# Patient Record
Sex: MALE | Race: WHITE | NOT HISPANIC OR LATINO | Employment: FULL TIME | URBAN - METROPOLITAN AREA
[De-identification: names, ages, dates, MRNs, and addresses within clinical notes are randomized per-mention and may not be internally consistent; named-entity substitution may affect disease eponyms.]

---

## 2021-09-19 ENCOUNTER — APPOINTMENT (OUTPATIENT)
Dept: GENERAL RADIOLOGY | Facility: CLINIC | Age: 56
End: 2021-09-19
Attending: EMERGENCY MEDICINE
Payer: COMMERCIAL

## 2021-09-19 ENCOUNTER — APPOINTMENT (OUTPATIENT)
Dept: CT IMAGING | Facility: CLINIC | Age: 56
End: 2021-09-19
Attending: EMERGENCY MEDICINE
Payer: COMMERCIAL

## 2021-09-19 ENCOUNTER — HOSPITAL ENCOUNTER (EMERGENCY)
Facility: CLINIC | Age: 56
Discharge: HOME | End: 2021-09-19
Attending: EMERGENCY MEDICINE | Admitting: EMERGENCY MEDICINE
Payer: COMMERCIAL

## 2021-09-19 ENCOUNTER — APPOINTMENT (OUTPATIENT)
Dept: MRI IMAGING | Facility: CLINIC | Age: 56
End: 2021-09-19
Attending: EMERGENCY MEDICINE
Payer: COMMERCIAL

## 2021-09-19 VITALS
WEIGHT: 192.46 LBS | DIASTOLIC BLOOD PRESSURE: 101 MMHG | RESPIRATION RATE: 16 BRPM | SYSTOLIC BLOOD PRESSURE: 156 MMHG | HEART RATE: 68 BPM | TEMPERATURE: 98.2 F | OXYGEN SATURATION: 98 %

## 2021-09-19 DIAGNOSIS — R20.0 LEFT ARM NUMBNESS: ICD-10-CM

## 2021-09-19 LAB
ANION GAP SERPL CALCULATED.3IONS-SCNC: 8 MMOL/L (ref 3–14)
APTT PPP: 30 SECONDS (ref 22–38)
BASOPHILS # BLD AUTO: 0 10E3/UL (ref 0–0.2)
BASOPHILS NFR BLD AUTO: 0 %
BUN SERPL-MCNC: 18 MG/DL (ref 7–30)
CALCIUM SERPL-MCNC: 8.9 MG/DL (ref 8.5–10.1)
CHLORIDE BLD-SCNC: 107 MMOL/L (ref 94–109)
CO2 SERPL-SCNC: 25 MMOL/L (ref 20–32)
CREAT SERPL-MCNC: 1.1 MG/DL (ref 0.66–1.25)
EOSINOPHIL # BLD AUTO: 0.2 10E3/UL (ref 0–0.7)
EOSINOPHIL NFR BLD AUTO: 3 %
ERYTHROCYTE [DISTWIDTH] IN BLOOD BY AUTOMATED COUNT: 12.4 % (ref 10–15)
GFR SERPL CREATININE-BSD FRML MDRD: 74 ML/MIN/1.73M2
GLUCOSE BLD-MCNC: 86 MG/DL (ref 70–99)
GLUCOSE BLDC GLUCOMTR-MCNC: 83 MG/DL (ref 70–99)
HCT VFR BLD AUTO: 49 % (ref 40–53)
HGB BLD-MCNC: 16.6 G/DL (ref 13.3–17.7)
HOLD SPECIMEN: NORMAL
HOLD SPECIMEN: NORMAL
IMM GRANULOCYTES # BLD: 0 10E3/UL
IMM GRANULOCYTES NFR BLD: 0 %
INR PPP: 0.95 (ref 0.85–1.15)
LYMPHOCYTES # BLD AUTO: 2.2 10E3/UL (ref 0.8–5.3)
LYMPHOCYTES NFR BLD AUTO: 30 %
MCH RBC QN AUTO: 31 PG (ref 26.5–33)
MCHC RBC AUTO-ENTMCNC: 33.9 G/DL (ref 31.5–36.5)
MCV RBC AUTO: 92 FL (ref 78–100)
MONOCYTES # BLD AUTO: 0.9 10E3/UL (ref 0–1.3)
MONOCYTES NFR BLD AUTO: 13 %
NEUTROPHILS # BLD AUTO: 4 10E3/UL (ref 1.6–8.3)
NEUTROPHILS NFR BLD AUTO: 54 %
NRBC # BLD AUTO: 0 10E3/UL
NRBC BLD AUTO-RTO: 0 /100
PLATELET # BLD AUTO: 229 10E3/UL (ref 150–450)
POTASSIUM BLD-SCNC: 3.6 MMOL/L (ref 3.4–5.3)
RBC # BLD AUTO: 5.35 10E6/UL (ref 4.4–5.9)
SODIUM SERPL-SCNC: 140 MMOL/L (ref 133–144)
TROPONIN I SERPL-MCNC: <0.015 UG/L (ref 0–0.04)
WBC # BLD AUTO: 7.4 10E3/UL (ref 4–11)

## 2021-09-19 PROCEDURE — 250N000013 HC RX MED GY IP 250 OP 250 PS 637: Performed by: EMERGENCY MEDICINE

## 2021-09-19 PROCEDURE — 99285 EMERGENCY DEPT VISIT HI MDM: CPT | Mod: 25

## 2021-09-19 PROCEDURE — 70498 CT ANGIOGRAPHY NECK: CPT

## 2021-09-19 PROCEDURE — 70450 CT HEAD/BRAIN W/O DYE: CPT

## 2021-09-19 PROCEDURE — 70553 MRI BRAIN STEM W/O & W/DYE: CPT

## 2021-09-19 PROCEDURE — 82374 ASSAY BLOOD CARBON DIOXIDE: CPT | Performed by: EMERGENCY MEDICINE

## 2021-09-19 PROCEDURE — A9585 GADOBUTROL INJECTION: HCPCS | Performed by: EMERGENCY MEDICINE

## 2021-09-19 PROCEDURE — 93005 ELECTROCARDIOGRAM TRACING: CPT

## 2021-09-19 PROCEDURE — 250N000011 HC RX IP 250 OP 636: Performed by: EMERGENCY MEDICINE

## 2021-09-19 PROCEDURE — 85730 THROMBOPLASTIN TIME PARTIAL: CPT | Performed by: EMERGENCY MEDICINE

## 2021-09-19 PROCEDURE — 250N000009 HC RX 250: Performed by: EMERGENCY MEDICINE

## 2021-09-19 PROCEDURE — 84484 ASSAY OF TROPONIN QUANT: CPT | Performed by: EMERGENCY MEDICINE

## 2021-09-19 PROCEDURE — 85610 PROTHROMBIN TIME: CPT | Performed by: EMERGENCY MEDICINE

## 2021-09-19 PROCEDURE — 255N000002 HC RX 255 OP 636: Performed by: EMERGENCY MEDICINE

## 2021-09-19 PROCEDURE — 96374 THER/PROPH/DIAG INJ IV PUSH: CPT | Mod: 59

## 2021-09-19 PROCEDURE — 0042T CT HEAD PERFUSION WITH CONTRAST: CPT

## 2021-09-19 PROCEDURE — 36415 COLL VENOUS BLD VENIPUNCTURE: CPT | Performed by: EMERGENCY MEDICINE

## 2021-09-19 PROCEDURE — 71045 X-RAY EXAM CHEST 1 VIEW: CPT

## 2021-09-19 PROCEDURE — 85004 AUTOMATED DIFF WBC COUNT: CPT | Performed by: EMERGENCY MEDICINE

## 2021-09-19 RX ORDER — ASPIRIN 325 MG
325 TABLET ORAL ONCE
Status: COMPLETED | OUTPATIENT
Start: 2021-09-19 | End: 2021-09-19

## 2021-09-19 RX ORDER — LORAZEPAM 2 MG/ML
1 INJECTION INTRAMUSCULAR ONCE
Status: COMPLETED | OUTPATIENT
Start: 2021-09-19 | End: 2021-09-19

## 2021-09-19 RX ORDER — GADOBUTROL 604.72 MG/ML
10 INJECTION INTRAVENOUS ONCE
Status: COMPLETED | OUTPATIENT
Start: 2021-09-19 | End: 2021-09-19

## 2021-09-19 RX ORDER — IOPAMIDOL 755 MG/ML
500 INJECTION, SOLUTION INTRAVASCULAR ONCE
Status: COMPLETED | OUTPATIENT
Start: 2021-09-19 | End: 2021-09-19

## 2021-09-19 RX ORDER — CLOPIDOGREL BISULFATE 75 MG/1
300 TABLET ORAL ONCE
Status: COMPLETED | OUTPATIENT
Start: 2021-09-19 | End: 2021-09-19

## 2021-09-19 RX ORDER — CLOPIDOGREL BISULFATE 75 MG/1
300 TABLET ORAL ONCE
Status: DISCONTINUED | OUTPATIENT
Start: 2021-09-19 | End: 2021-09-19

## 2021-09-19 RX ADMIN — IOPAMIDOL 50 ML: 755 INJECTION, SOLUTION INTRAVENOUS at 13:48

## 2021-09-19 RX ADMIN — GADOBUTROL 8.5 ML: 604.72 INJECTION INTRAVENOUS at 15:48

## 2021-09-19 RX ADMIN — SODIUM CHLORIDE 95 ML: 9 INJECTION, SOLUTION INTRAVENOUS at 14:00

## 2021-09-19 RX ADMIN — CLOPIDOGREL BISULFATE 300 MG: 75 TABLET ORAL at 14:29

## 2021-09-19 RX ADMIN — LORAZEPAM 1 MG: 2 INJECTION INTRAMUSCULAR; INTRAVENOUS at 15:10

## 2021-09-19 RX ADMIN — ASPIRIN 325 MG ORAL TABLET 325 MG: 325 PILL ORAL at 14:29

## 2021-09-19 ASSESSMENT — ENCOUNTER SYMPTOMS
NECK PAIN: 0
NUMBNESS: 1
MYALGIAS: 0
HEADACHES: 0
WEAKNESS: 0

## 2021-09-19 NOTE — DISCHARGE INSTRUCTIONS
You will need to follow-up with outpatient neurology and get a outpatient echocardiogram to further rule out any possible causes for stroke.  Your MRI and CT today did not show any clear signs of stroke and we believe it is safe for you to go home.  However if you have any recurring symptoms or worsening symptoms please return to the emergency department immediately.  Please begin taking a full aspirin daily until you follow-up with neurology team.

## 2021-09-19 NOTE — CONSULTS
"Neurology note     Dr Hackett called me. MRI brain is done. No acute pathology. Symptoms are still ongoing but numbness felt in the left UE only.     The negative MRI despite ongoing symptoms suggests that the symptoms are not secondary to acute brain ischemia. Possible peripheral process.     We will finish the TIA workup as an outpatient and refer to general neurology.     -No further ED workup at this point.   -Patient can be discharged home on aspirin only.   -Outpatient TTE.   -Follow up with outpatient general neurology in 1-2 weeks.       Ottoniel Merida MD, Msc, MADI, ALICIAN   of Neurology  [facility]     09/19/2021 5:06 PM  To page me or covering stroke neurology team member, click here: AMCOM  Choose \"On Call\" tab at top, then search dropdown box for \"Neurology Adult\" & press Enter, look for Neuro ICU/Stroke    "

## 2021-09-19 NOTE — ED PROVIDER NOTES
"  History   Chief Complaint:  Numbness     The history is provided by the patient.      Drake Rendon is a 56 year old male with a history of hyperlipidemia and hypertension who presents with left arm numbness that began 60-90 minutes prior to arrival. The patient was sitting in his chair reading the paper when the numbness first began and he initially attributed this to his sitting position. He tried standing up and pacing around the house but did not have any improvement in symptoms. Patient ultimately came to ED as he began to notice some numbness on the left side of his face as well as his left lower leg, he notes history of stroke in maternal side of family and became concerned. Here, the patient continues to have numbness in his entire left arm that he likens to the sensation of \"being asleep\" but denies any associated weakness. Denies having any pain to his arm. Also has ongoing numbness to L side of head though patient is unsure whether this is related to his arm symptoms versus history of chronic congestion. Patient denies any headaches or unsteadiness with walking. No neck pain. No prior history of abnormal heart rhythm or other heart problems.    Review of Systems   Musculoskeletal: Negative for gait problem (unsteadiness with walking), myalgias (arm pain) and neck pain.   Neurological: Positive for numbness (L arm, L face). Negative for weakness and headaches.   All other systems reviewed and are negative.    Allergies:  No Known Drug Allergies    Medications:  Lisinopril  Zyrtec     Past Medical History:    Hypertension  Hyperlipidemia   Chronic congestion    Past Surgical History:    Nasal septum surgery    Family History:    Father - arthritis  Mother - stroke    Social History:  The patient was not accompanied to the ED.  Marital status:     Physical Exam     Patient Vitals for the past 24 hrs:   BP Temp Temp src Pulse Resp SpO2 Weight   09/19/21 1445 (!) 156/101 -- -- 68 16 98 % -- "   21 1430 -- -- -- 66 11 99 % --   21 1415 (!) 191/107 -- -- 68 21 99 % --   21 1413 -- -- -- 65 12 98 % --   21 1412 -- -- -- 72 29 97 % --   21 1411 -- -- -- 61 11 98 % --   21 1409 (!) 173/103 -- -- 63 9 100 % --   21 1339 -- -- -- -- -- 98 % --   21 1338 (!) 185/102 -- -- 66 -- -- --   21 1332 -- -- -- -- -- -- 87.3 kg (192 lb 7.4 oz)   21 1331 (!) 163/111 98.2  F (36.8  C) Temporal 63 16 100 % --       Physical Exam  General: Patient is awake, alert and interactive when I enter the room  Head: The scalp, face, and head appear normal  Eyes: The pupils are equal, round, and reactive to light. Conjunctivae and sclerae are normal. No nystagmus. Full ROM of both eyes and appears symmetric without obvious palsy.   ENT: External acoustic canals are normal. The oropharynx is normal without erythema. Uvula is in the midline  Neck: Normal range of motion. No anterior cervical lymphadenopathy noted  CV: Regular rate. S1/S2. No murmurs.   Resp: Lungs are clear without wheezes or rales. No respiratory distress.   GI: Abdomen is soft, no rigidity, guarding, or rebound. No distension. No tenderness to palpation in any quadrant.     MS: Normal tone. Joints grossly normal without effusions. No asymmetric leg swelling, calf or thigh tenderness.    Skin: No rash or lesions noted. Normal capillary refill noted  Neuro:   Speech is normal and fluent.   Face is symmetric without droop. CN's II-XII intact. Negative pronator drift. Finger to nose intact. Heel to shin intact.   Right Arm: Good  strength. 5/5 elbow flexion. 5/5 elbow extension. Sensation intact to light touch.   Left Arm: Good  strength. 5/5 elbow flexion. 5/5 elbow extension.  Subjective change in sensation along the left forearm extending to the left thumb.  Right Le/5 straight leg raise, 5/5 knee flexion, 5/5 knee extension, 5/5 dorsiflexion, 5/5 plantar flexion. Sensation intact to light touch.    Left Le/5 straight leg raise, 5/5 knee flexion, 5/5 knee extension, 5/5 dorsiflexion, 5/5 plantar flexion. Sensation intact to light touch.    Psych:  Normal affect.  Appropriate interactions.    Emergency Department Course     ECG:  Completed at 1435.  Read at 1439.   Normal sinus rhythm   Rate 67 bpm. KS interval 156. QRS duration 90. QT/QTc 426/450. P-R-T axes 38 41 5.  Agree with computer interpretation.     Imaging:  CT Head w/o Contrast:  1.  Normal head CT.  Report per radiology.    CTA Head Neck with Contrast:  HEAD CTA:   1.  Normal CTA [place].  NECK CTA:  1.  Normal neck CTA.  Report per radiology.    CT Head Perfusion w Contrast:  1.  Normal cerebral perfusion.  Report per radiology.    XR Chest Port 1 View:  No pleural fluid or pneumothorax. No airspace disease or edema. Normal size of the heart.  Report per radiology.    MR Brain w/o & w Contrast:  In process  Report per radiology.    Laboratory:  CBC: WBC 7.4, HGB 16.6,   BMP: AWNL (Creatinine 1.10)  Glucose by meter (collected 1337) 83    PTT: 30  INR: 0.95    Troponin (Collected 1343): <0.015    Emergency Department Course:    Reviewed:  I reviewed the patient's nursing notes, vitals, past medical history and care everywhere.     Assessments:  1336 I performed an exam of the patient in room 33 as documented above.  1339 Code stroke Tier 2 activated  1413 Patient rechecked and updated on findings and plan.      Consults:    1342 I spoke with Abril Montague PA-C of the Stroke Neurology service regarding patient's presentation, findings, and plan of care.  1411 I again spoke with Abril Montague in Stroke Neurology regarding patient's CT findings. They recommend MRI, aspirin, and Plavix.    Interventions:  1429 Aspirin 325 mg PO  1429 Plavix 300 mg PO    Disposition:  Care of the patient was transferred to my colleague Dr. Lowe pending MRI results, anticipate discharge home.    Impression & Plan     Medical Decision Making:  Drake DELA CRUZ  Justice is a 56 year old male who presents with numbness to his left arm and face that started approximately 1 hour prior to presentation.  Patient has personal history of hyperlipidemia and high blood pressure as well as family history of stroke but no personal history of TIA or CVA.  Patient was urgently evaluated and stroke code was called.  See physical exam above.  This was highlighted by subjective decrease in light touch to the left arm.  No other acute focal neurological deficits were noted.  CT imaging was obtained which did not show any evidence of large vessel occlusion, intracranial hemorrhage or vascular insult.  On reevaluation the patient's physical exam remains stable.  Spoke with stroke neurology who recommended MRI as well as Plavix load and treatment with aspirin.  MRI of the brain was obtained.  Which fortunately did not show any sign of infarction or additional abnormality.  Once again discussed the case with stroke neurology.  We are not completely convinced that this represents central stroke pathology.  We will have the patient continue to take aspirin and follow-up outpatient for further stroke work-up.  Given minimal symptoms patient was not considered a candidate for TPA.    Diagnosis:    ICD-10-CM    1. Left arm numbness  R20.0          Scribe Disclosure:  I, Yoly Benz, am serving as a scribe at 1:36 PM on 9/19/2021 to document services personally performed by Alberto Hackett MD based on my observations and the provider's statements to me.     This note was completed in part using Dragon voice recognition software. Although reviewed after completion, some word and grammatical errors may occur.     Yoly Benz  9/19/2021   [facility] EMERGENCY DEPARTMENT         Alberto Hackett MD  09/20/21 0645

## 2021-09-19 NOTE — ED TRIAGE NOTES
Pt presents to ED with c/o numbness in left forearm that began 1 hour ago. Also experiencing very mild numbness in his left face and left lower leg as well. Pt is concerned for stroke as he has high BP. ABC intact.

## 2021-09-19 NOTE — CONSULTS
[facility]    Stroke Telephone Note    I was called by Alberto Hackett on 09/19/21 regarding patient Drake Rendon. The patient is a 56 year old male with past medical history significant for HTN and HLD who presented to the [facility] ED for evaluation of numbness. He was sitting down reading the paper when he noticed that his left arm was numb. He thought it was positional and tried to shake it out. Shortly thereafter he noticed some left cheek and left leg numbness as well. There are no reports of focal weakness, language difficulty, or gait imbalance. NIHSS 1 for subjective sensory changes per ED. Given low NIHSS and mild deficits, not felt to be a candidate for thrombolytics.     Stroke Code Data (for stroke code without tele)  Stroke code activated 09/19/21   1340   Stroke provider first response  09/19/21   1342        1342   Last known normal 09/19/21   1225        Time of discovery   (or onset of symptoms) 09/19/21   1230   Head CT read by Stroke Neuro Dr/Provider 09/19/21   1354   Was stroke code de-escalated? Yes 09/19/21 1413  other (see comments) mild deficits     Imaging Findings   CTH negative for acute pathology. CTA head/neck unrenarkable    Thrombolytic Treatment   Not given due to minor/isolated/quickly resolving symptoms.    Endovascular Treatment  Not initiated due to absence of proximal vessel occlusion    Impression  Left hemibody numbness without other focal deficits, suspect subcortical ischemia    Recommendations   - MRI brain w/wo, please page stroke neuro when imaging is complete  - Load with Plavix 300 mg +  mg now  - If stroke is present, admit for formal stroke neurology consult: LDL, A1c, TTE, telemetry, permissive HTN (treat for SBP >220)    My recommendations are based on the information provided over the phone by Drake Rendon's in-person providers. They are not intended to replace the clinical judgment of his in-person providers. I was not  "requested to personally see or examine the patient at this time.    REHAN Apple, CNP  Neurology  To page me or covering stroke neurology team member, click here: AMCOM   Choose \"On Call\" tab at top, then search dropdown box for \"Neurology Adult\", select location, press Enter, then look for stroke/neuro ICU/telestroke.           "

## 2021-09-20 ENCOUNTER — PATIENT OUTREACH (OUTPATIENT)
Dept: CARE COORDINATION | Facility: CLINIC | Age: 56
End: 2021-09-20

## 2021-09-20 DIAGNOSIS — G45.9 TIA (TRANSIENT ISCHEMIC ATTACK): Primary | ICD-10-CM

## 2021-09-20 LAB
ATRIAL RATE - MUSE: 67 BPM
DIASTOLIC BLOOD PRESSURE - MUSE: NORMAL MMHG
INTERPRETATION ECG - MUSE: NORMAL
P AXIS - MUSE: 38 DEGREES
PR INTERVAL - MUSE: 156 MS
QRS DURATION - MUSE: 90 MS
QT - MUSE: 426 MS
QTC - MUSE: 450 MS
R AXIS - MUSE: 41 DEGREES
SYSTOLIC BLOOD PRESSURE - MUSE: NORMAL MMHG
T AXIS - MUSE: 5 DEGREES
VENTRICULAR RATE- MUSE: 67 BPM

## 2021-09-20 NOTE — PROGRESS NOTES
Noted that lipid panel and A1c not included in ED labwork. Did you want that on pt?    Dr. Merida recommended gen neuro follow-up in 1-2 weeks, but that cannot be accommodated so can we do the ALEJANDRA visit tomorrow with plan for gen neuro in about 6 weeks?    Susana Manzo BS, RN, SCRN  RN Stroke Neurology Care Coordinator  [facility] Neuroscience Service Line

## 2021-09-20 NOTE — PROGRESS NOTES
ED TIA Pathway patient. Chart reviewed    Neurology phone note while in the hospital: YES    Head CT or Brain MRI completed:NO    Head and Neck Vessel Imaging completed (MRA Head/Neck or CTA Head/Neck):YES    Patient scheduled for Stroke ALEJANDRA Virtual appointment 9/21/21 at 11:00 AM     Please Notify patient of scheduled follow up and Assist with scheduling Echocardiogram    JOHNNIE SCHNEIDER CMA

## 2021-09-21 ENCOUNTER — TELEPHONE (OUTPATIENT)
Dept: CARE COORDINATION | Facility: CLINIC | Age: 56
End: 2021-09-21

## 2021-09-21 ENCOUNTER — VIRTUAL VISIT (OUTPATIENT)
Dept: NEUROLOGY | Facility: CLINIC | Age: 56
End: 2021-09-21
Attending: PHYSICIAN ASSISTANT
Payer: COMMERCIAL

## 2021-09-21 DIAGNOSIS — R44.9 FOCAL SENSORY LOSS: Primary | ICD-10-CM

## 2021-09-21 PROCEDURE — 99205 OFFICE O/P NEW HI 60 MIN: CPT | Mod: 95 | Performed by: PHYSICIAN ASSISTANT

## 2021-09-21 RX ORDER — LISINOPRIL 20 MG/1
TABLET ORAL
COMMUNITY
Start: 2021-07-13

## 2021-09-21 RX ORDER — CETIRIZINE HYDROCHLORIDE 10 MG/1
TABLET ORAL
COMMUNITY

## 2021-09-21 NOTE — PROGRESS NOTES
Oscar is a 56 year old who is being evaluated via a billable video visit.      How would you like to obtain your AVS? Minteos    Video Start Time: 11:11  Video-Visit Details    Type of service:  Video Visit    Video End Time:11:55    Originating Location (pt. Location): Home    Distant Location (provider location):  [facility] NEUROLOGY CLIN[facility]     Platform used for Video Visit: Educanon    __________________________________________________________      [facility] Vascular Neurology Stroke Clinic    Virtual Clinic - [phone]  __________________________________________________________    Chief Complaint: Follow-up for Left sided numbness possible TIA    History of Present Illness: Drake Rendon is a 56 year old male presenting as a new patient to TIA clinic. He presented to the [facility] emergency department on 21 because of Left arm, leg leg, and left facial numbness beginning approximately 1 hour prior to arrival to the ED.  On arrival he was hypertensive in the 190s.  He was not a TNK candidate due to lack of disabling deficit. ECG with NSR, CT, CTA, CTP, CXR, and brain MRI all negative for acute pathology.  He was given DAPT then discharged on ASA only. He was directed to follow-up with general neurology for further work-up of other potential peripheral processes causing symptoms as well as the TIA clinic. He is scheduled for outpatient TTE.    He has a past medical history of prior chewing tobacco user (quit in ), HLD, HTN, and significant family history of CVA--mother had several strokes as well as maternal grandfather.    TIA Evaluation Summarized  MRI and/or Head CT: 21 CT head and brain MRI negative acute pathology  Intracranial Vascular Imagin21 CTA and CTP negative for acute pathology  Cervical Carotid and Vertebral Artery Vascular Imagin21 CTA negative for acute pathology  Echocardiogram: pending for Monday  EKG/Telemetry: NSR   LDL:  "01/2021  (repeat pending)  A1c: pending  Troponin: <0.015  Other testing: Not Applicable     ABCD2 Patients Score   Age ? 60 years 1 point 0   Blood Pressure     -SBP ? 140 or DBP ?  90    1 point 1   Clinical Features    -Unilateral weakness   -Speech disturbance w/o weakness    -Other    2 points  1 point    0 points 0   Duration of symptoms    ?60 minutes    10-59 minutes    <10 minutes   2 points  1 point  0 points 2   Diabetes  1 point A1c pending   Patient s ABCD2 Score (0-7) = 3/4? Pending A1c     He was continued on home lisinopril 20 mg daily and started on  mg. Since the above-mentioned ER visit, he reports continued, although improved, numbness to his forearm from elbow to thumb.     He further clarified his initial symptoms: He woke up normal on Sunday the 19th. Around 10/10:30a he was reading a paper with his wife and felt forearm numbness starting. He says that he then got up and noticed some numbness to his left leg so was concerned of possible stroke so presented for evaluation after talking with wife. He is not someone who comes to the ED for minimal symptoms. He reports that the \"numbness\" to his left face was not really numbness and has been present and unchanged for a long time prior to Sunday. He says he is chronically congested and takes Zyrtec daily, he feels instead of numbness this is more of a difference in pressure around the left ear.    His forearm weakness is still present at visit today and unchanged, left leg numbness resolved. This does not give any weakness or decreased ability to perform  ADLs. He denies any headache or other neuro symptoms. He was taking lisinopril 20 mg daily as prescribed and states that it is always high usually 140s/100s, typically <120 in AMs though.     Discussed with patient that given symptoms are persistent there is a possibility that there could have been a small vessel stroke that didn't show up on the MRI. Based on his ASCVD score he is " at a 69% risk so obtaining a repeat MRI would likely not change our recommendations as he should still be started on a statin and full dose Aspirin, TTE, f/u with PCP and general neurologist. He will need to closely monitor blood pressure and make adjustments to antihypertensive if continued to be elevated. Patient agreed and wanted to avoid MRI due to severe claustrophobia unless absolutely needed. Discussed with attending Dr. Gomez and agreed on plan of care.    Modified [place] Scale  Score: 1-No significant disability despite symptoms; able to carry out all usual duties and activities    Impression:   Problem List Items Addressed This Visit     None      Visit Diagnoses     Focal sensory loss    -  Primary    Relevant Orders    Adult Neurology Referral        55 yo male with L arm/leg/facial numbness 9/19/21 with head/neck tissue and vessel imaging negative for acute pathology. Differential includes TIA versus possible peripheral neurologic process.      Plan:   -TTE pending for Monday, we will follow-up if there are any new recommendations based on findings  -continued smoking cessation is encouraged  -continue  mg daily indefinitely, if any future providers recommend stopping ASA prior to procedures, etc. Please include stroke neurology team in the discussion  -check blood pressure at home, continue lisinopril 20 mg daily (f/u with PCP within 3-4 weeks and bring log of daily BP readings to visit), long term goal <130/80, f/u with PCP for chronic congestive symptoms  - A1C needed (added onto stored specimen here) (goal <7%), recommend Mediterranean diet, will follow-up if any concerns with result  -LDL January 2021 was 159, recommend starting atorvastatin 40 mg daily (repeat Lipid panel pending, will contact if any different recommendations based on results), goal LDL 40-70  -Follow up with general neurology for further work-up on other possible peripheral etiologies for numbness symptoms  - Clinical trial  "screening: [facility] (discussed eligibility for trial, patient declined)     Stroke Education provided.  He will call us with any questions.  For any acute neurologic deficits he was advised to  go directly to the hospital rather than call the clinic.    Julia Ortiz PA-C  Neurology  09/21/2021 12:44 PM  To page me or covering stroke neurology team member, click here: AMCOM  Choose \"On Call\" tab at top, then search dropdown box for \"Neurology Adult\" & press Enter, look for Neuro ICU/Stroke    ___________________________________________________________________    Current Medications  Current Outpatient Medications   Medication Sig     aspirin (ASA) 325 MG EC tablet Take 1 tablet (325 mg) by mouth daily for 21 days     cetirizine (ZYRTEC) 10 MG tablet      lisinopril (ZESTRIL) 20 MG tablet      No current facility-administered medications for this visit.       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 ZURITA   Kidney stone 2010     Essential hypertension ([facility]) 6/23/2014     Mixed hyperlipidemia ([facility]) 08/20/2018 9 YEAR ASCVD 9.53%   Obesity (BMI 30-39.9) ([facility]) 8/20/2018         Social History  Social History     Tobacco Use     Smoking status: Never Smoker     Smokeless tobacco: Former User     Types: Chew   Substance Use Topics     Alcohol use: Yes     Comment: quit chewing after 2 years     Drug use: No       Family History  Family History   Problem Relation Age of Onset     Transient ischemic attack Mother      Cerebrovascular Disease Mother      Arthritis Father      Cerebrovascular Disease Maternal Grandfather      Heart Disease No family hx of      Diabetes No family hx of      Cancer No family hx of        Physical Exam     Vitals - Patient Reported 9/21/2021   Weight (Patient Reported) 190 lb       General:  no acute distress  HEENT:  normocephalic/atraumatic  Pulmonary:  no respiratory distress    Neurologic  Mental Status:  alert, " "oriented x 3, answers age and month correctly follows commands, speech clear and fluent, naming and repetition normal, able to spell \"WORLD\" backwards and subtract 7 from 100  Cranial Nerves:  EOMI with normal smooth pursuit, facial movements symmetric, hearing not formally tested but intact to conversation, no dysarthria, shoulder shrug equal bilaterally, tongue protrusion midline  Motor:  no abnormal movements, able to move all limbs antigravity spontaneously with no signs of hemiparesis observed, no pronator drift  Reflexes:  unable to test (telestroke)  Sensory:  unable to test (telestroke)  Coordination:  normal finger-to-nose bilaterally without dysmetria, rapid alternating movements symmetric, normal arm roll and finger tapping  Station/Gait:  unable to test (telestroke)    Neuroimaging: as per HPI. I personally reviewed those images    Labs:    Coagulation studies:  Recent Labs   Lab Test 09/19/21  1343   INR 0.95        Lipid panel:  No lab results found.    HbA1C:  No lab results found.      Billing:    I spent a total of 80 minutes on the day of the visit.   Time spent doing chart review, history and exam, documentation and further activities per the note        "

## 2021-09-21 NOTE — TELEPHONE ENCOUNTER
Labwork has also been ordered by ALEJANDRA. Can you assist in scheduling that as well or give him the number to call himself?    Susana Manzo BS, RN, SCRN  RN Stroke Neurology Care Coordinator  [facility] Neuroscience Service Line

## 2021-09-21 NOTE — TELEPHONE ENCOUNTER
Check out report from ED TIA follow up appointment 9/21/21:  Return in about 2 months (around 11/21/2021) for Follow up with general neurology.    To cem and assist with scheduling.    JOHNNIE SCHNEIDER CMA

## 2021-09-21 NOTE — LETTER
9/21/2021         RE: Drake Rendon  [address] [facility] [id number]        Dear Colleague,    Thank you for referring your patient, Drake Rendon, to the [facility]UR[facility] [place]. Please see a copy of my visit note below.      Oscar is a 56 year old who is being evaluated via a billable video visit.      How would you like to obtain your AVS? [employer]    Video Start Time: 11:11  Video-Visit Details    Type of service:  Video Visit    Video End Time:11:55    Originating Location (pt. Location): Home    Distant Location (provider location):  [facility] NEUROLOGY [facility]     Platform used for Video Visit: [employer]    __________________________________________________________      [facility] Neurology Stroke [facility]    Virtual Clinic - [phone]  __________________________________________________________    Chief Complaint: Follow-up for Left sided numbness possible TIA    History of Present Illness: Drake Rendon is a 56 year old male presenting as a new patient to TIA clinic. He presented to the [facility] emergency department on 9/19/21 because of Left arm, leg leg, and left facial numbness beginning approximately 1 hour prior to arrival to the ED.  On arrival he was hypertensive in the 190s.  He was not a TNK candidate due to lack of disabling deficit. ECG with NSR, CT, CTA, CTP, CXR, and brain MRI all negative for acute pathology.  He was given DAPT then discharged on ASA only. He was directed to follow-up with general neurology for further work-up of other potential peripheral processes causing symptoms as well as the TIA clinic. He is scheduled for outpatient TTE.    He has a past medical history of prior smoker (quit in 1990s), HLD, HTN, and significant family history of CVA--mother had several strokes as well as maternal grandfather.    TIA Evaluation Summarized  MRI and/or Head CT: 9/19/21 CT head and brain MRI negative acute  "pathology  Intracranial Vascular Imagin21 CTA and CTP negative for acute pathology  Cervical Carotid and Vertebral Artery Vascular Imagin21 CTA negative for acute pathology  Echocardiogram: pending for Monday  EKG/Telemetry: NSR   LDL: 2021  (repeat pending)  A1c: pending  Troponin: <0.015  Other testing: Not Applicable     ABCD2 Patients Score   Age ? 60 years 1 point 0   Blood Pressure     -SBP ? 140 or DBP ?  90    1 point 1   Clinical Features    -Unilateral weakness   -Speech disturbance w/o weakness    -Other    2 points  1 point    0 points 0   Duration of symptoms    ?60 minutes    10-59 minutes    <10 minutes   2 points  1 point  0 points 2   Diabetes  1 point A1c pending   Patient s ABCD2 Score (0-7) = 3/4? Pending A1c     He was continued on home lisinopril 20 mg daily and started on  mg. Since the above-mentioned ER visit, he reports continued, although improved, numbness to his forearm from elbow to thumb.     He further clarified his initial symptoms: He woke up normal on  the . Around 1010:30a he was reading a paper with his wife and felt forearm numbness starting. He says that he then got up and noticed some numbness to his left leg so was concerned of possible stroke so presented for evaluation after talking with wife. He is not someone who comes to the ED for minimal symptoms. He reports that the \"numbness\" to his left face was not really numbness and has been present and unchanged for a long time prior to . He says he is chronically congested and takes Zyrtec daily, he feels instead of numbness this is more of a difference in pressure around the left ear.    His forearm weakness is still present at visit today and unchanged, left leg numbness resolved. This does not give any weakness or decreased ability to perform  ADLs. He denies any headache or other neuro symptoms. He was taking lisinopril 20 mg daily as prescribed and states that it is always " high usually 140s/100s, typically <120 in AMs though.     Discussed with patient that given symptoms are persistent there is a possibility that there could have been a small vessel stroke that didn't show up on the MRI. Based on his ASCVD score he is at a 69% risk so obtaining a repeat MRI would likely not change our recommendations as he should still be started on a statin and full dose Aspirin, TTE, f/u with PCP and general neurologist. He will need to closely monitor blood pressure and make adjustments to antihypertensive if continued to be elevated. Patient agreed and wanted to avoid MRI due to severe claustrophobia unless absolutely needed. Discussed with attending Dr. Gomez and agreed on plan of care.    Modified Wilder Scale  Score: 1-No significant disability despite symptoms; able to carry out all usual duties and activities    Impression:   Problem List Items Addressed This Visit     None      Visit Diagnoses     Focal sensory loss    -  Primary        55 yo male with L arm/leg/facial numbness 9/19/21 with head/neck tissue and vessel imaging negative for acute pathology. Differential includes TIA versus possible peripheral neurologic process.      Plan:   -TTE pending for Monday, we will follow-up if there are any new recommendations based on findings  -continued smoking cessation is encouraged  -continue  mg daily indefinitely, if any future providers recommend stopping ASA prior to procedures, etc. Please include stroke neurology team in the discussion  -check blood pressure at home, continue lisinopril 20 mg daily (f/u with PCP within 3-4 weeks and bring log of daily BP readings to visit), long term goal <130/80, f/u with PCP for chronic congestive symptoms  - A1C needed (added onto stored specimen here) (goal <7%), recommend Mediterranean diet, will follow-up if any concerns with result  -LDL January 2021 was 159, recommend starting atorvastatin 40 mg daily (repeat Lipid panel pending, will contact  "if any different recommendations based on results), goal LDL 40-70  -Follow up with general neurology for further work-up on other possible peripheral etiologies for numbness symptoms  - Clinical trial screening: Shavon (discussed eligibility for trial, patient declined)     Stroke Education provided.  He will call us with any questions.  For any acute neurologic deficits he was advised to  go directly to the hospital rather than call the clinic.    Julia Ortiz PA-C  Neurology  09/21/2021 12:28 PM  To page me or covering stroke neurology team member, click here: [facility]  Choose \"On Call\" tab at top, then search dropdown box for \"Neurology Adult\" & press Enter, look for Neuro ICU/Stroke    ___________________________________________________________________    Current Medications  Current Outpatient Medications   Medication Sig     aspirin (ASA) 325 MG EC tablet Take 1 tablet (325 mg) by mouth daily for 21 days     cetirizine (ZYRTEC) 10 MG tablet      lisinopril (ZESTRIL) 20 MG tablet      No current facility-administered medications for this visit.       Past Medical History  No past medical history on file.    Social History  Social History     Tobacco Use     Smoking status: Never Smoker     Smokeless tobacco: Former User     Types: Chew   Substance Use Topics     Alcohol use: Yes     Comment: quit chewing after 2 years     Drug use: No       Family History  Family History   Problem Relation Age of Onset     Family History Negative Mother      Family History Negative Father        Physical Exam     Vitals - Patient Reported 9/21/2021   Weight (Patient Reported) 190 lb       General:  no acute distress  HEENT:  normocephalic/atraumatic  Pulmonary:  no respiratory distress    Neurologic  Mental Status:  alert, oriented x 3, answers age and month correctly follows commands, speech clear and fluent, naming and repetition normal, able to spell \"WORLD\" backwards and subtract 7 from 100  Cranial Nerves:  EOMI with " normal smooth pursuit, facial movements symmetric, hearing not formally tested but intact to conversation, no dysarthria, shoulder shrug equal bilaterally, tongue protrusion midline  Motor:  no abnormal movements, able to move all limbs antigravity spontaneously with no signs of hemiparesis observed, no pronator drift  Reflexes:  unable to test (telestroke)  Sensory:  unable to test (telestroke)  Coordination:  normal finger-to-nose bilaterally without dysmetria, rapid alternating movements symmetric, normal arm roll and finger tapping  Station/Gait:  unable to test (telestroke)    Neuroimaging: as per HPI. I personally reviewed those images    Labs:    Coagulation studies:  Recent Labs   Lab Test 09/19/21  1343   INR 0.95        Lipid panel:  No lab results found.    HbA1C:  No lab results found.      Billing:    I spent a total of 80 minutes on the day of the visit.   Time spent doing chart review, history and exam, documentation and further activities per the note            Again, thank you for allowing me to participate in the care of your patient.        Sincerely,         Neurology Stroke ALEJANDRA

## 2021-09-22 NOTE — TELEPHONE ENCOUNTER
Called to assist with scheduling general Neuro follow up in 2 months as requested below and also let him now that he has labs to complete and ensure he knows how to schedule those. I will call back.    JOHNNIE SCHNEIDER, CMA

## 2021-09-27 ENCOUNTER — HOSPITAL ENCOUNTER (OUTPATIENT)
Dept: CARDIOLOGY | Facility: CLINIC | Age: 56
Discharge: HOME | End: 2021-09-27
Attending: EMERGENCY MEDICINE | Admitting: EMERGENCY MEDICINE
Payer: COMMERCIAL

## 2021-09-27 LAB — LVEF ECHO: NORMAL

## 2021-09-27 PROCEDURE — 258N000001 HC RX 258: Performed by: EMERGENCY MEDICINE

## 2021-09-27 PROCEDURE — 255N000002 HC RX 255 OP 636: Performed by: EMERGENCY MEDICINE

## 2021-09-27 PROCEDURE — 93306 TTE W/DOPPLER COMPLETE: CPT | Mod: 26 | Performed by: INTERNAL MEDICINE

## 2021-09-27 PROCEDURE — 999N000208 ECHOCARDIOGRAM COMPLETE

## 2021-09-27 RX ORDER — ACYCLOVIR 200 MG/1
30 CAPSULE ORAL ONCE
Status: COMPLETED | OUTPATIENT
Start: 2021-09-27 | End: 2021-09-27

## 2021-09-27 RX ADMIN — SODIUM CHLORIDE 30 ML: 9 INJECTION, SOLUTION INTRAMUSCULAR; INTRAVENOUS; SUBCUTANEOUS at 14:15

## 2021-09-27 RX ADMIN — HUMAN ALBUMIN MICROSPHERES AND PERFLUTREN 3 ML: 10; .22 INJECTION, SOLUTION INTRAVENOUS at 14:14

## 2021-09-27 NOTE — TELEPHONE ENCOUNTER
Spoke to patient. Unable to schedule at this time, asked writer to call back tomorrow 9/28/21 afternoon.    JOHNNIE SCHNEIDER CMA

## 2021-10-01 NOTE — TELEPHONE ENCOUNTER
Spoke with patient, states he is seeing PCP next week and will have labs done there, requested phone number for General neuro scheduling in "[id number] message.    JOHNNIE SCHNEIDER, CMA

## 2021-10-24 ENCOUNTER — HEALTH MAINTENANCE LETTER (OUTPATIENT)
Age: 56
End: 2021-10-24

## 2022-10-16 ENCOUNTER — HEALTH MAINTENANCE LETTER (OUTPATIENT)
Age: 57
End: 2022-10-16

## 2023-11-04 ENCOUNTER — HEALTH MAINTENANCE LETTER (OUTPATIENT)
Age: 58
End: 2023-11-04